# Patient Record
Sex: MALE | Race: ASIAN | Employment: FULL TIME | URBAN - METROPOLITAN AREA
[De-identification: names, ages, dates, MRNs, and addresses within clinical notes are randomized per-mention and may not be internally consistent; named-entity substitution may affect disease eponyms.]

---

## 2023-02-02 ENCOUNTER — HOSPITAL ENCOUNTER (EMERGENCY)
Age: 33
Discharge: HOME | End: 2023-02-02
Attending: EMERGENCY MEDICINE
Payer: COMMERCIAL

## 2023-02-02 VITALS
RESPIRATION RATE: 20 BRPM | BODY MASS INDEX: 29.99 KG/M2 | WEIGHT: 180 LBS | TEMPERATURE: 98.4 F | HEIGHT: 65 IN | SYSTOLIC BLOOD PRESSURE: 129 MMHG | HEART RATE: 75 BPM | DIASTOLIC BLOOD PRESSURE: 83 MMHG | OXYGEN SATURATION: 100 %

## 2023-02-02 DIAGNOSIS — G44.009 CLUSTER HEADACHE, NOT INTRACTABLE, UNSPECIFIED CHRONICITY PATTERN: Primary | ICD-10-CM

## 2023-02-02 PROCEDURE — 74011250636 HC RX REV CODE- 250/636

## 2023-02-02 PROCEDURE — 99284 EMERGENCY DEPT VISIT MOD MDM: CPT

## 2023-02-02 PROCEDURE — 96375 TX/PRO/DX INJ NEW DRUG ADDON: CPT

## 2023-02-02 PROCEDURE — 96374 THER/PROPH/DIAG INJ IV PUSH: CPT

## 2023-02-02 RX ORDER — DEXAMETHASONE SODIUM PHOSPHATE 10 MG/ML
10 INJECTION INTRAMUSCULAR; INTRAVENOUS ONCE
Status: COMPLETED | OUTPATIENT
Start: 2023-02-02 | End: 2023-02-02

## 2023-02-02 RX ORDER — KETOROLAC TROMETHAMINE 30 MG/ML
30 INJECTION, SOLUTION INTRAMUSCULAR; INTRAVENOUS
Status: COMPLETED | OUTPATIENT
Start: 2023-02-02 | End: 2023-02-02

## 2023-02-02 RX ORDER — METOCLOPRAMIDE HYDROCHLORIDE 5 MG/ML
10 INJECTION INTRAMUSCULAR; INTRAVENOUS
Status: COMPLETED | OUTPATIENT
Start: 2023-02-02 | End: 2023-02-02

## 2023-02-02 RX ORDER — DIPHENHYDRAMINE HYDROCHLORIDE 50 MG/ML
12.5 INJECTION, SOLUTION INTRAMUSCULAR; INTRAVENOUS
Status: COMPLETED | OUTPATIENT
Start: 2023-02-02 | End: 2023-02-02

## 2023-02-02 RX ADMIN — DIPHENHYDRAMINE HYDROCHLORIDE 12.5 MG: 50 INJECTION, SOLUTION INTRAMUSCULAR; INTRAVENOUS at 14:26

## 2023-02-02 RX ADMIN — SODIUM CHLORIDE 1000 ML: 9 INJECTION, SOLUTION INTRAVENOUS at 14:26

## 2023-02-02 RX ADMIN — DEXAMETHASONE SODIUM PHOSPHATE 10 MG: 10 INJECTION INTRAMUSCULAR; INTRAVENOUS at 14:25

## 2023-02-02 RX ADMIN — KETOROLAC TROMETHAMINE 30 MG: 30 INJECTION, SOLUTION INTRAMUSCULAR; INTRAVENOUS at 14:26

## 2023-02-02 RX ADMIN — METOCLOPRAMIDE 10 MG: 5 INJECTION, SOLUTION INTRAMUSCULAR; INTRAVENOUS at 14:26

## 2023-02-02 NOTE — ED TRIAGE NOTES
Pt comes to ED with c/o headache and right eye pain since Monday which is causing him to be nauseated. Pt states he has hx of migraines and takes OTC medication. He reports no relief.

## 2023-02-02 NOTE — Clinical Note
[address]  [address] [facility] [address]  [phone]    Work/School Note    Date: 2/2/2023    To Whom It May concern:      Laura Parra was seen and treated today in the emergency room by the following provider(s):  Attending Provider: Antoneita Claros DO  Nurse Practitioner: Sukhi Villafuerte. Laura Parra is excused from work/school on 02/02/23. He is clear to return to work/school on 02/03/23.         Sincerely,          Soila Alex

## 2023-02-02 NOTE — ED PROVIDER NOTES
Hugo Gómez is a 26 y.o. male w/ hx migraine c/o right eye pain w/ nausea, vomiting, headache. Pt reports having intermittent right eye pain since 2015 and had seen opthalmologic where he was told there was nothing wrong with eye. Pt reports unable to concentrating while at work. Pt reports being on computer at least 8 hours a day for work. Pt reports taking tylenol, advil, aleve that relieves the pain briefly for about 1 hr or 2. Pt reports feeling pressure on right eye but sometimes is on the left eye. Pt reports when \"rubbing the eyelid\" pt reports getting some relief. Pt denies vision changes, double vision, photosensitivity, wearing of the eye, issues w/ ROM on eye, fever, chills, cough, sore throat. Medical hx: asthma  Surgical hx: denies  Social hx: denies smoking, denies etoh, denies illicit drug use. The history is provided by the patient. No  was used. Headache   Pertinent negatives include no fever, no weakness and no vomiting. History reviewed. No pertinent past medical history. History reviewed. No pertinent surgical history. History reviewed. No pertinent family history.     Social History     Socioeconomic History    Marital status: SINGLE     Spouse name: Not on file    Number of children: Not on file    Years of education: Not on file    Highest education level: Not on file   Occupational History    Not on file   Tobacco Use    Smoking status: Never    Smokeless tobacco: Never   Substance and Sexual Activity    Alcohol use: Not Currently    Drug use: Never    Sexual activity: Not on file   Other Topics Concern    Not on file   Social History Narrative    Not on file     Social Determinants of Health     Financial Resource Strain: Not on file   Food Insecurity: Not on file   Transportation Needs: Not on file   Physical Activity: Not on file   Stress: Not on file   Social Connections: Not on file   Intimate Partner Violence: Not on file   Housing Stability: Not on file         ALLERGIES: Patient has no known allergies. Review of Systems   Constitutional:  Negative for activity change, appetite change, chills and fever. HENT:  Negative for congestion, rhinorrhea and sore throat. Eyes:  Negative for visual disturbance. Respiratory:  Negative for cough. Gastrointestinal:  Negative for vomiting. Genitourinary:  Negative for decreased urine volume and difficulty urinating. Musculoskeletal:  Negative for myalgias. Skin:  Negative for rash. Neurological:  Positive for headaches. Negative for speech difficulty, weakness and numbness. All other systems reviewed and are negative. Vitals:    02/02/23 1344   BP: 129/83   Pulse: 75   Resp: 20   Temp: 98.4 °F (36.9 °C)   SpO2: 100%   Weight: 81.6 kg (180 lb)   Height: 5' 5\" (1.651 m)            Physical Exam  Vitals reviewed. Constitutional:       General: He is not in acute distress. Appearance: Normal appearance. He is normal weight. HENT:      Head: Normocephalic. Right Ear: Tympanic membrane normal.      Left Ear: Tympanic membrane normal.      Nose: No rhinorrhea. Eyes:      Extraocular Movements: Extraocular movements intact. Conjunctiva/sclera: Conjunctivae normal.      Pupils: Pupils are equal, round, and reactive to light. Comments: Right eyelid slightly droopy   Cardiovascular:      Rate and Rhythm: Normal rate and regular rhythm. Pulmonary:      Effort: Pulmonary effort is normal. No respiratory distress. Breath sounds: Normal breath sounds. No wheezing or rhonchi. Musculoskeletal:         General: Normal range of motion. Cervical back: Normal range of motion and neck supple. Skin:     General: Skin is warm and dry. Capillary Refill: Capillary refill takes less than 2 seconds. Neurological:      Mental Status: He is alert and oriented to person, place, and time. Mental status is at baseline. Cranial Nerves: No cranial nerve deficit. Gait: Gait normal.      3:42 PM  Pt was asleep when Np went to reevaluate. When pt woke up, pt report \"I do not feel anything right now\" when asked about headache and eye pain. Plan reviewed with pt. Pt states understanding. Medical Decision Making  Risk  Prescription drug management. Pt is a 27 y/o male presenting to the ED c/o right eye pain and headache. Doubt periorbital abscess given no obvious swelling on the right orbits and pt did not have any difficulty or pain w/ occular movements. Doubt intracranial abnormality given no focal neurologic deficits noted. Doubt meningitis given pt is afebrile and no nuchal rigidity noted. Pt reports pain improved after given decadron, benadryl, toradol, reglan, and 1L NS. Pt is stable for discharge home. Pt encouraged to call friend to bring him home and not drive himself since he was given benadryl. Pt states understanding. Pt instructed to continue ibuprofen and tylenol as needed for pain while at home. Pt encouraged to follow-up with the listed neurologist as soon as possible. Strict return to ED precautions given. ACI discussed with the patient; see instruction below. Patient verbalized understanding. Procedures    N/A      LABORATORY TESTS:  No results found for this or any previous visit (from the past 12 hour(s)). IMAGING RESULTS:  No orders to display       MEDICATIONS GIVEN:  Medications   metoclopramide HCl (REGLAN) injection 10 mg (10 mg IntraVENous Given 2/2/23 1426)   diphenhydrAMINE (BENADRYL) injection 12.5 mg (12.5 mg IntraVENous Given 2/2/23 1426)   dexamethasone (PF) (DECADRON) 10 mg/mL injection 10 mg (10 mg IntraVENous Given 2/2/23 7285)   ketorolac (TORADOL) injection 30 mg (30 mg IntraVENous Given 2/2/23 1426)   sodium chloride 0.9 % bolus infusion 1,000 mL (0 mL IntraVENous IV Completed 2/2/23 8698)       IMPRESSION:  1. Cluster headache, not intractable, unspecified chronicity pattern        PLAN:  1.  Ibuprofen and tylenol as needed for headache. 2. Follow-up with neurology clinic. Follow-up Information       Follow up With Specialties Details Why [address] [place] [address] Neurology Clinic  Schedule an appointment as soon as possible for a visit on 2/6/2023  Marty [address] [place] [address] [phone]    Primary care provider  On 2/6/2023      [address] [facility] Emergency Medicine  As needed, If symptoms worsen [address] [facility]  [phone]          3.  Return to ED if worse     Signed By: SOBEIDA Dinh     February 2, 2023

## 2023-02-02 NOTE — Clinical Note
[address]  [address] [facility] [address]  [phone]    Work/School Note    Date: 2/2/2023    To Whom It May concern:    Monet Castaneda was seen and treated today in the emergency room by the following provider(s):  Attending Provider: Marcus Bee,   Nurse Practitioner: Christen Ochoa. Monet Castaneda is excused from work/school on 02/02/23 and 02/03/23. He is medically clear to return to work/school on 2/4/2023.        Sincerely,          Steph Mitchell

## 2023-02-02 NOTE — DISCHARGE INSTRUCTIONS
Continue to take ibuprofen with meals and tylenol to help with pain. Follow-up with the listed neurologist and call for appointment as soon as possible. Come back to the emergency department if the headache feels different than your usual, altered mental status, issues w/ speech, or any other concerns.

## 2023-05-01 ENCOUNTER — OFFICE VISIT (OUTPATIENT)
Dept: NEUROLOGY | Age: 33
End: 2023-05-01
Payer: COMMERCIAL

## 2023-05-01 VITALS
HEIGHT: 65 IN | DIASTOLIC BLOOD PRESSURE: 70 MMHG | OXYGEN SATURATION: 98 % | SYSTOLIC BLOOD PRESSURE: 120 MMHG | TEMPERATURE: 97.3 F | WEIGHT: 181 LBS | HEART RATE: 92 BPM | BODY MASS INDEX: 30.16 KG/M2

## 2023-05-01 DIAGNOSIS — R51.9 INTRACTABLE EPISODIC HEADACHE, UNSPECIFIED HEADACHE TYPE: Primary | ICD-10-CM

## 2023-05-01 PROCEDURE — 99204 OFFICE O/P NEW MOD 45 MIN: CPT | Performed by: PSYCHIATRY & NEUROLOGY

## 2023-05-01 RX ORDER — BUTALBITAL, ACETAMINOPHEN AND CAFFEINE 300; 40; 50 MG/1; MG/1; MG/1
CAPSULE ORAL
COMMUNITY
Start: 2023-03-21

## 2023-05-01 RX ORDER — VERAPAMIL HYDROCHLORIDE 120 MG/1
120 TABLET, FILM COATED, EXTENDED RELEASE ORAL
Qty: 30 TABLET | Refills: 1 | Status: SHIPPED | OUTPATIENT
Start: 2023-05-01

## 2023-05-01 NOTE — PROGRESS NOTES
NEUROLOGY NEW PATIENT OFFICE CONSULTATION      5/1/2023    RE: Edilberto Thompson         1990      REFERRED BY:  Nicole, MD Stephen        CHIEF COMPLAINT:  This is Edilberto Thompson is a 26 y.o. 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 HPI:     Since 2014, patient has been having headaches, L side behind the eye, lasting 2-3 hrs, sharp stabbing, 7/10, occurring 2/ week (+) nausea (-) vomiting (-) photophobia (-) Phonophobia    (-) tearing of eyes  (-) redness of eyes  Tylenol and Fioricet work  Excedrin did not work    ROS  (-) fever  (-) rash  All other systems reviewed and are negative    Past Medical Hx  No past medical history on file. Social Hx  Social History     Socioeconomic History    Marital status: SINGLE   Tobacco Use    Smoking status: Never    Smokeless tobacco: Never   Substance and Sexual Activity    Alcohol use: Not Currently    Drug use: Never       Family Hx  No family history on file. (+) diabetes - mother    ALLERGIES  No Known Allergies    CURRENT MEDS  Current Outpatient Medications   Medication Sig Dispense Refill    butalbital-acetaminophen-caff (FIORICET) -40 mg per capsule 1 capsule as needed Orally every 6 hrs prn for 30 days      verapamil ER (CALAN-SR) 120 mg tablet Take 1 Tablet by mouth nightly. 30 Tablet 1           PREVIOUS WORKUP: (reviewed)  IMAGING:    CT Results (recent):  No results found for this or any previous visit. MRI Results (recent):  No results found for this or any previous visit. IR Results (recent):  No results found for this or any previous visit. VAS/US Results (recent):  No results found for this or any previous visit. LABS (reviewed)  No results found for this or any previous visit. Physical Exam:   Visit Vitals  /70   Pulse 92   Temp 97.3 °F (36.3 °C)   Ht 5' 5\" (1.651 m)   Wt 82.1 kg (181 lb)   SpO2 98%   BMI 30.12 kg/m²     General:  Alert, cooperative, no distress. Head:  Normocephalic, without obvious abnormality, atraumatic. Eyes:  Conjunctivae/corneas clear. Lungs:  Heart:   Non labored breathing  Regular rate and rhythm, no carotid bruits   Abdomen:   Soft, non-distended   Extremities: Extremities normal, atraumatic, no cyanosis or edema. Pulses: 2+ and symmetric all extremities. Skin: Skin color, texture, turgor normal. No rashes or lesions. Neurologic Exam     Gen: Attention normal             Language: naming, repetition, fluency normal             Memory: intact recent and remote memory  Cranial Nerves:  I: smell Not tested   II: visual fields Full to confrontation   II: pupils Equal, round, reactive to light   II: optic disc No papilledema   III,VII: ptosis none   III,IV,VI: extraocular muscles  Full ROM   V: mastication normal   V: facial light touch sensation  normal   VII: facial muscle function   symmetric   VIII: hearing symmetric   IX: soft palate elevation  normal   XI: trapezius strength  5/5   XI: sternocleidomastoid strength 5/5   XI: neck flexion strength  5/5   XII: tongue  midline     Motor: normal bulk and tone, no tremor              Strength: 5/5 all four extremities  Sensory: intact to LT, PP, vibration, and JPS  Reflexes: 2+ throughout; Down going toes  Coordination: Good FTN and HTS  Gait: normal gait including tandem            Impression:     Rekha Mejia is a 26 y.o. male who  has no past medical history on file. who since 2014, patient has been having headaches, L side behind the eye, lasting 2-3 hrs, sharp stabbing, 7/10, occurring 2/ week (+) nausea (-) vomiting (-) photophobia (-) Phonophobia. Tylenol and Fioricet work. Excedrin did not work. Considerations include cluster headache and migraine, intractable    RECOMMENDATIONS  1. I had a long discussion with patient. Discussed diagnosis, prognosis, pathophysiology and available treatment. Reviewed test results. All questions were answered.   2. Trial of Verapamil  mg 1 tab QHS for possible cluster headache. Monitor for low BP  3. Continue Tylenol and Fioricet to abort headaches for now  4. Discussed the need for headache diary and went through the list that can trigger headache (I.e. stress, chocolate)  5. Will order MRI of brain if still no improvement despite above    Follow-up and Dispositions    Return in about 1 month (around 6/1/2023).             Thank you for the consultation      Leonel Ferrell MD  Diplomate, American Board of Psychiatry and Neurology  Diplomate, Neuromuscular Medicine  Diplomate, American Board of Electrodiagnostic Medicine        CC: Other, MD Stephen  Fax: None